# Patient Record
Sex: FEMALE | Race: WHITE | Employment: FULL TIME | URBAN - METROPOLITAN AREA
[De-identification: names, ages, dates, MRNs, and addresses within clinical notes are randomized per-mention and may not be internally consistent; named-entity substitution may affect disease eponyms.]

---

## 2021-04-01 ENCOUNTER — HOSPITAL ENCOUNTER (OUTPATIENT)
Age: 56
Discharge: HOME | End: 2021-04-01
Payer: COMMERCIAL

## 2021-04-01 ENCOUNTER — HOSPITAL ENCOUNTER (OUTPATIENT)
Dept: GENERAL RADIOLOGY | Age: 56
Discharge: HOME | End: 2021-04-01
Payer: COMMERCIAL

## 2021-04-01 DIAGNOSIS — R06.02 SOB (SHORTNESS OF BREATH): ICD-10-CM

## 2021-04-01 PROCEDURE — 71046 X-RAY EXAM CHEST 2 VIEWS: CPT

## 2021-05-06 ENCOUNTER — APPOINTMENT (OUTPATIENT)
Dept: GENERAL RADIOLOGY | Age: 56
End: 2021-05-06
Payer: COMMERCIAL

## 2021-05-06 ENCOUNTER — HOSPITAL ENCOUNTER (EMERGENCY)
Age: 56
Discharge: HOME | End: 2021-05-06
Attending: EMERGENCY MEDICINE
Payer: COMMERCIAL

## 2021-05-06 VITALS
BODY MASS INDEX: 48.73 KG/M2 | TEMPERATURE: 98.4 F | OXYGEN SATURATION: 98 % | HEART RATE: 78 BPM | RESPIRATION RATE: 16 BRPM | DIASTOLIC BLOOD PRESSURE: 87 MMHG | SYSTOLIC BLOOD PRESSURE: 156 MMHG | WEIGHT: 275 LBS | HEIGHT: 63 IN

## 2021-05-06 DIAGNOSIS — M17.11 OSTEOARTHRITIS OF RIGHT KNEE, UNSPECIFIED OSTEOARTHRITIS TYPE: ICD-10-CM

## 2021-05-06 DIAGNOSIS — M25.461 KNEE EFFUSION, RIGHT: Primary | ICD-10-CM

## 2021-05-06 PROCEDURE — 99283 EMERGENCY DEPT VISIT LOW MDM: CPT

## 2021-05-06 PROCEDURE — 73560 X-RAY EXAM OF KNEE 1 OR 2: CPT

## 2021-05-06 RX ORDER — BENZONATATE 100 MG/1
100 CAPSULE ORAL 3 TIMES DAILY PRN
COMMUNITY

## 2021-05-06 RX ORDER — ALBUTEROL SULFATE 90 UG/1
2 AEROSOL, METERED RESPIRATORY (INHALATION) EVERY 6 HOURS PRN
COMMUNITY

## 2021-05-06 RX ORDER — HYDROCODONE BITARTRATE AND ACETAMINOPHEN 5; 325 MG/1; MG/1
1 TABLET ORAL EVERY 6 HOURS PRN
Qty: 10 TABLET | Refills: 0 | Status: SHIPPED | OUTPATIENT
Start: 2021-05-06 | End: 2021-05-13

## 2021-05-06 RX ORDER — IBUPROFEN 800 MG/1
800 TABLET ORAL EVERY 6 HOURS PRN
Qty: 24 TABLET | Refills: 0 | Status: SHIPPED | OUTPATIENT
Start: 2021-05-06

## 2021-05-06 ASSESSMENT — PAIN DESCRIPTION - LOCATION: LOCATION: KNEE

## 2021-05-06 ASSESSMENT — ENCOUNTER SYMPTOMS
SHORTNESS OF BREATH: 0
BACK PAIN: 0
ABDOMINAL PAIN: 0

## 2021-05-06 ASSESSMENT — PAIN DESCRIPTION - ORIENTATION: ORIENTATION: RIGHT

## 2021-05-06 NOTE — ED PROVIDER NOTES
[facility] Name: Emily Balderas  MRN: [id number]  [place] 1965  Date of evaluation: 5/6/2021  Provider: Mallika Rogers MD    CHIEF COMPLAINT       Chief Complaint   Patient presents with    Knee Pain     Right knee pain that is worsening x2 months         HISTORY OF PRESENT ILLNESS   (Location/Symptom, Timing/Onset, Context/Setting, Quality, Duration, Modifying Factors, Severity)  Note limiting factors. Emily Balderas is a 48 y.o. female who presents to the emergency department     Patient presents with severe right-sided knee pain. Just getting worse, gradually over several months. She had been using some Motrin with some good relief but that has been getting worse now said that she is having a hard time sleeping at night  She also describes that she can barely be at work because she has to move her leg  It is isolated to the right knee itself patient does suffer unfortunately from obesity she does have the appearance of somebody with some osteoarthritis. She denies any fever and it denies any acute trauma denies any other joint pain denies any other systemic illnesses or being immunocompromised    The history is provided by the patient and the spouse. Nursing Notes were reviewed. REVIEW OF SYSTEMS    (2-9 systems for level 4, 10 or more for level 5)     Review of Systems   Constitutional: Positive for activity change. Negative for chills and fever. Respiratory: Negative for shortness of breath. Cardiovascular: Negative for chest pain. Gastrointestinal: Negative for abdominal pain. Musculoskeletal: Positive for arthralgias, gait problem and joint swelling. Negative for back pain, myalgias and neck pain. Neurological: Negative for dizziness. Psychiatric/Behavioral: Negative for behavioral problems. All other systems reviewed and are negative.       Except as noted above the remainder of the review of systems was reviewed and negative. PAST MEDICAL HISTORY   History reviewed. No pertinent past medical history. SURGICAL HISTORY       Past Surgical History:   Procedure Laterality Date    APPENDECTOMY      COSMETIC SURGERY      LIVER SURGERY      laceration repair         CURRENT MEDICATIONS       Previous Medications    ALBUTEROL SULFATE HFA (VENTOLIN HFA) 108 (90 BASE) MCG/ACT INHALER    Inhale 2 puffs into the lungs every 6 hours as needed for Wheezing    BENZONATATE (TESSALON) 100 MG CAPSULE    Take 100 mg by mouth 3 times daily as needed for Cough       ALLERGIES     Patient has no known allergies. FAMILY HISTORY     History reviewed. No pertinent family history.        SOCIAL HISTORY       Social History     Socioeconomic History    Marital status:      Spouse name: None    Number of children: None    Years of education: None    Highest education level: None   Occupational History    None   Social Needs    Financial resource strain: None    Food insecurity     Worry: None     Inability: None    Transportation needs     Medical: None     Non-medical: None   Tobacco Use    Smoking status: Current Every Day Smoker     Packs/day: 0.50     Types: Cigarettes    Smokeless tobacco: Never Used   Substance and Sexual Activity    Alcohol use: Yes     Comment: OCC    Drug use: No    Sexual activity: Yes     Partners: Male   Lifestyle    Physical activity     Days per week: None     Minutes per session: None    Stress: None   Relationships    Social connections     Talks on phone: None     Gets together: None     Attends Church service: None     Active member of club or organization: None     Attends meetings of clubs or organizations: None     Relationship status: None    Intimate partner violence     Fear of current or ex partner: None     Emotionally abused: None     Physically abused: None     Forced sexual activity: None   Other Topics Concern    None   Social History Narrative    None absence of a cardiologist.        RADIOLOGY:   Non-plain film images such as CT, Ultrasound and MRI are read by the radiologist. Plain radiographic images are visualized and preliminarily interpreted by the emergency physician with the below findings:        Interpretation per the Radiologist below, if available at the time of this note:    XR KNEE RIGHT (1-2 VIEWS)   Final Result   1. No acute osseous abnormality of the right knee. 2. Moderate medial compartment joint space narrowing. 3. Suspected moderate suprapatellar joint effusion. LABS:  No results found for this visit on 05/06/21. EMERGENCY DEPARTMENT COURSE and DIFFERENTIAL DIAGNOSIS/MDM:     Vitals:    05/06/21 1556   BP: (!) 159/97   Pulse: 84   Resp: 18   Temp: 98.4 °F (36.9 °C)   TempSrc: Oral   SpO2: 97%   Weight: 275 lb (124.7 kg)   Height: 5' 3\" (1.6 m)           MDM      REASSESSMENT          CRITICAL CARE TIME     CONSULTS:  None      PROCEDURES:     Procedures    MEDICATIONS GIVEN THIS VISIT:  Medications - No data to display     FINAL IMPRESSION      1. Knee effusion, right    2. Osteoarthritis of right knee, unspecified osteoarthritis type            DISPOSITION/PLAN   DISPOSITION Decision To Discharge 05/06/2021 05:09:33 PM      PATIENT REFERRED TO:  Mary Donald MD    Schedule an appointment as soon as possible for a visit   [id number]      DISCHARGE MEDICATIONS:  New Prescriptions    HYDROCODONE-ACETAMINOPHEN (NORCO) 5-325 MG PER TABLET    Take 1 tablet by mouth every 6 hours as needed for Pain for up to 10 doses. IBUPROFEN (IBU) 800 MG TABLET    Take 1 tablet by mouth every 6 hours as needed for Pain       Controlled Substances Monitoring  No flowsheet data found.         (Please note that portions of this note were completed with a voice recognition program.  Efforts were made to edit the dictations but occasionally words are mis-transcribed.)    Patient was advised to return to the Emergency Department if

## 2021-05-06 NOTE — ED NOTES
AVS provided and reviewed with the patient. The patient verbalized understanding of care at home, follow up care, and emergent symptoms to return for. The patient verbalized understanding of medication side effects and restrictions. No questions or concerns verbalized at this time. The patient is alert, oriented, stable, and ambulatory out of the department at the time of discharge. Discharged with prescriptions x2 and a work note.      Saman Skinner RN  05/06/21 7700

## 2021-05-06 NOTE — LETTER
[facility] Emergency Department  [address] [address]  Phone: [phone]               May 6, 2021    Patient: Kenya Zuñiga   YOB: 1965   Date of Visit: 5/6/2021       To Whom It May Concern:    Kenya Zuñiga was seen and treated in our emergency department on 5/6/2021. She may return to work on 5/10/21.       Sincerely,    Attending        Signature:__________________________________